# Patient Record
Sex: MALE | Race: ASIAN | NOT HISPANIC OR LATINO
[De-identification: names, ages, dates, MRNs, and addresses within clinical notes are randomized per-mention and may not be internally consistent; named-entity substitution may affect disease eponyms.]

---

## 2021-01-08 ENCOUNTER — APPOINTMENT (OUTPATIENT)
Dept: UROLOGY | Facility: CLINIC | Age: 36
End: 2021-01-08
Payer: COMMERCIAL

## 2021-01-08 VITALS — HEART RATE: 60 BPM | TEMPERATURE: 97.7 F | SYSTOLIC BLOOD PRESSURE: 110 MMHG | DIASTOLIC BLOOD PRESSURE: 71 MMHG

## 2021-01-08 DIAGNOSIS — Z80.3 FAMILY HISTORY OF MALIGNANT NEOPLASM OF BREAST: ICD-10-CM

## 2021-01-08 DIAGNOSIS — Z78.9 OTHER SPECIFIED HEALTH STATUS: ICD-10-CM

## 2021-01-08 PROCEDURE — 99072 ADDL SUPL MATRL&STAF TM PHE: CPT

## 2021-01-08 PROCEDURE — 99204 OFFICE O/P NEW MOD 45 MIN: CPT

## 2021-01-08 NOTE — HISTORY OF PRESENT ILLNESS
[FreeTextEntry1] : 36 yo male referred for left renal stones seen on CT ordered by the patient's PCP for hematuria and left flank pain.  The patient noted gross hematuria and left flank discomfort on 12/23.  CT scan showed 4-5 left sided non-obstructing renal stones ranging in size from 1-4 mm.  There was no hydronephrosis noted.  A pelvic calcification was seen which seems most likely to be a phlebolith.  \par \par He currently is pain free for over a week and has not seen any blood in his urine since the 23rd.  He has no personal or family hx of stones. He denies any baseline voiding symptoms.

## 2021-01-08 NOTE — PHYSICAL EXAM
[General Appearance - Well Developed] : well developed [Normal Appearance] : normal appearance [General Appearance - In No Acute Distress] : no acute distress [Heart Rate And Rhythm] : Heart rate and rhythm were normal [] : no respiratory distress [Abdomen Soft] : soft [Abdomen Tenderness] : non-tender [Costovertebral Angle Tenderness] : no ~M costovertebral angle tenderness [Penis Abnormality] : normal uncircumcised penis [Testes Tenderness] : no tenderness of the testes [Testes Mass (___cm)] : there were no testicular masses [Prostate Tenderness] : the prostate was not tender [No Prostate Nodules] : no prostate nodules [Normal Station and Gait] : the gait and station were normal for the patient's age [Skin Color & Pigmentation] : normal skin color and pigmentation [No Focal Deficits] : no focal deficits [Oriented To Time, Place, And Person] : oriented to person, place, and time

## 2021-01-08 NOTE — ASSESSMENT
[FreeTextEntry1] : 34 yo male with probable recently passed stone and with 4-5 small non-obstructing left renal stones.\par \par The patient was seen and examined and results were reviewed. \par \par Kidney stone disease was reviewed with the patient and etiologies/risk factors were discussed. Preventative measures were discussed including hydration, diet modification, and medications.Options for management were reviewed including conservative management (no intervention), medical expulsive therapy, and surgical management. Merits and limitations of each option was discussed. \par \par The possibility of spontaneous stone passage based on stone size and location was reviewed. Risks of spontaneous passage including pain, hematuria, fever, chills, nausea/emesis, infection, urosepsis, dehydration, ureteral or renal injury, need for repeat office or emergency room visits, and permanent loss of renal function were reviewed.\par \par Surgical options were presented including ureteral stent placement, extracorporeal shockwave lithotripsy (ESWL), and ureteroscopy with laser lithotripsy. The procedures were discussed in depth. Success rates, complications, and potential for subsequent procedures was discussed for each option.The role of imaging studies including ultrasound, plain film x-rays, and CT scan were discussed, as well as potential radiation exposure risks. Metabolic evaluation with serum chemistry and 24 hour urine collections were presented for the purpose of determining the etiology of the patient's stone forming risk factors.\par \par He would like to proceed with scheduling URS with laser lithotripsy.  We reviewed the risks and benefits.  We reviewed the need for a ureteral stent and the associated symptoms that he may experience with a stent. \par

## 2021-01-11 LAB
APPEARANCE: CLEAR
BACTERIA UR CULT: NORMAL
BACTERIA: NEGATIVE
BILIRUBIN URINE: NEGATIVE
BLOOD URINE: NEGATIVE
COLOR: NORMAL
GLUCOSE QUALITATIVE U: NEGATIVE
HYALINE CASTS: 1 /LPF
KETONES URINE: NEGATIVE
LEUKOCYTE ESTERASE URINE: NEGATIVE
MICROSCOPIC-UA: NORMAL
NITRITE URINE: NEGATIVE
PH URINE: 6
PROTEIN URINE: NEGATIVE
RED BLOOD CELLS URINE: 0 /HPF
SPECIFIC GRAVITY URINE: 1.02
SQUAMOUS EPITHELIAL CELLS: 0 /HPF
UROBILINOGEN URINE: NORMAL
WHITE BLOOD CELLS URINE: 0 /HPF

## 2021-02-15 ENCOUNTER — TRANSCRIPTION ENCOUNTER (OUTPATIENT)
Age: 36
End: 2021-02-15

## 2021-02-16 ENCOUNTER — APPOINTMENT (OUTPATIENT)
Dept: UROLOGY | Facility: AMBULATORY SURGERY CENTER | Age: 36
End: 2021-02-16

## 2021-02-16 ENCOUNTER — OUTPATIENT (OUTPATIENT)
Dept: OUTPATIENT SERVICES | Facility: HOSPITAL | Age: 36
LOS: 1 days | Discharge: HOME | End: 2021-02-16
Payer: COMMERCIAL

## 2021-02-16 DIAGNOSIS — N20.0 CALCULUS OF KIDNEY: ICD-10-CM

## 2021-02-16 LAB — SARS-COV-2 N GENE NPH QL NAA+PROBE: NOT DETECTED

## 2021-02-16 PROCEDURE — 52356 CYSTO/URETERO W/LITHOTRIPSY: CPT | Mod: LT

## 2021-02-16 PROCEDURE — 74420 UROGRAPHY RTRGR +-KUB: CPT | Mod: 26,LT

## 2021-02-16 RX ORDER — CEPHALEXIN 500 MG
1 CAPSULE ORAL
Qty: 6 | Refills: 0
Start: 2021-02-16 | End: 2021-02-18

## 2021-02-16 RX ORDER — DIAZEPAM 5 MG
1 TABLET ORAL
Qty: 12 | Refills: 0
Start: 2021-02-16 | End: 2021-02-19

## 2021-02-16 NOTE — BRIEF OPERATIVE NOTE - NSICDXBRIEFPROCEDURE_GEN_ALL_CORE_FT
PROCEDURES:  Cystoscopic insertion of left ureteral stent 16-Feb-2021 18:01:31  Curtis Ohara  Left ureteroscopy with laser lithotripsy 16-Feb-2021 18:01:14  Curtis Ohara

## 2021-02-20 ENCOUNTER — EMERGENCY (EMERGENCY)
Facility: HOSPITAL | Age: 36
LOS: 1 days | Discharge: HOME | End: 2021-02-20
Attending: EMERGENCY MEDICINE | Admitting: EMERGENCY MEDICINE
Payer: COMMERCIAL

## 2021-02-20 ENCOUNTER — NON-APPOINTMENT (OUTPATIENT)
Age: 36
End: 2021-02-20

## 2021-02-20 VITALS
WEIGHT: 149.91 LBS | DIASTOLIC BLOOD PRESSURE: 85 MMHG | HEIGHT: 67 IN | TEMPERATURE: 98 F | OXYGEN SATURATION: 98 % | SYSTOLIC BLOOD PRESSURE: 136 MMHG | RESPIRATION RATE: 18 BRPM | HEART RATE: 75 BPM

## 2021-02-20 VITALS
OXYGEN SATURATION: 99 % | RESPIRATION RATE: 16 BRPM | SYSTOLIC BLOOD PRESSURE: 130 MMHG | TEMPERATURE: 98 F | HEART RATE: 70 BPM | DIASTOLIC BLOOD PRESSURE: 78 MMHG

## 2021-02-20 DIAGNOSIS — N99.89 OTHER POSTPROCEDURAL COMPLICATIONS AND DISORDERS OF GENITOURINARY SYSTEM: ICD-10-CM

## 2021-02-20 DIAGNOSIS — R31.9 HEMATURIA, UNSPECIFIED: ICD-10-CM

## 2021-02-20 LAB
ALBUMIN SERPL ELPH-MCNC: 4.2 G/DL — SIGNIFICANT CHANGE UP (ref 3.3–5)
ALP SERPL-CCNC: 80 U/L — SIGNIFICANT CHANGE UP (ref 40–120)
ALT FLD-CCNC: 12 U/L — SIGNIFICANT CHANGE UP (ref 10–45)
ANION GAP SERPL CALC-SCNC: 10 MMOL/L — SIGNIFICANT CHANGE UP (ref 5–17)
APPEARANCE UR: CLEAR — SIGNIFICANT CHANGE UP
APTT BLD: 35.5 SEC — SIGNIFICANT CHANGE UP (ref 27.5–35.5)
AST SERPL-CCNC: 14 U/L — SIGNIFICANT CHANGE UP (ref 10–40)
BACTERIA # UR AUTO: PRESENT /HPF
BASOPHILS # BLD AUTO: 0.01 K/UL — SIGNIFICANT CHANGE UP (ref 0–0.2)
BASOPHILS NFR BLD AUTO: 0.2 % — SIGNIFICANT CHANGE UP (ref 0–2)
BILIRUB SERPL-MCNC: 0.2 MG/DL — SIGNIFICANT CHANGE UP (ref 0.2–1.2)
BILIRUB UR-MCNC: NEGATIVE — SIGNIFICANT CHANGE UP
BUN SERPL-MCNC: 13 MG/DL — SIGNIFICANT CHANGE UP (ref 7–23)
CALCIUM SERPL-MCNC: 10 MG/DL — SIGNIFICANT CHANGE UP (ref 8.4–10.5)
CHLORIDE SERPL-SCNC: 105 MMOL/L — SIGNIFICANT CHANGE UP (ref 96–108)
CO2 SERPL-SCNC: 26 MMOL/L — SIGNIFICANT CHANGE UP (ref 22–31)
COLOR SPEC: ABNORMAL
CREAT SERPL-MCNC: 0.99 MG/DL — SIGNIFICANT CHANGE UP (ref 0.5–1.3)
CULTURE RESULTS: NO GROWTH — SIGNIFICANT CHANGE UP
DIFF PNL FLD: ABNORMAL
EOSINOPHIL # BLD AUTO: 0.03 K/UL — SIGNIFICANT CHANGE UP (ref 0–0.5)
EOSINOPHIL NFR BLD AUTO: 0.5 % — SIGNIFICANT CHANGE UP (ref 0–6)
EPI CELLS # UR: SIGNIFICANT CHANGE UP /HPF (ref 0–5)
GLUCOSE SERPL-MCNC: 90 MG/DL — SIGNIFICANT CHANGE UP (ref 70–99)
GLUCOSE UR QL: NEGATIVE — SIGNIFICANT CHANGE UP
HCT VFR BLD CALC: 38 % — LOW (ref 39–50)
HGB BLD-MCNC: 12.6 G/DL — LOW (ref 13–17)
IMM GRANULOCYTES NFR BLD AUTO: 0.2 % — SIGNIFICANT CHANGE UP (ref 0–1.5)
INR BLD: 1.07 — SIGNIFICANT CHANGE UP (ref 0.88–1.16)
KETONES UR-MCNC: NEGATIVE — SIGNIFICANT CHANGE UP
LEUKOCYTE ESTERASE UR-ACNC: ABNORMAL
LYMPHOCYTES # BLD AUTO: 1.34 K/UL — SIGNIFICANT CHANGE UP (ref 1–3.3)
LYMPHOCYTES # BLD AUTO: 21.1 % — SIGNIFICANT CHANGE UP (ref 13–44)
MCHC RBC-ENTMCNC: 31.3 PG — SIGNIFICANT CHANGE UP (ref 27–34)
MCHC RBC-ENTMCNC: 33.2 GM/DL — SIGNIFICANT CHANGE UP (ref 32–36)
MCV RBC AUTO: 94.3 FL — SIGNIFICANT CHANGE UP (ref 80–100)
MONOCYTES # BLD AUTO: 0.43 K/UL — SIGNIFICANT CHANGE UP (ref 0–0.9)
MONOCYTES NFR BLD AUTO: 6.8 % — SIGNIFICANT CHANGE UP (ref 2–14)
NEUTROPHILS # BLD AUTO: 4.53 K/UL — SIGNIFICANT CHANGE UP (ref 1.8–7.4)
NEUTROPHILS NFR BLD AUTO: 71.2 % — SIGNIFICANT CHANGE UP (ref 43–77)
NITRITE UR-MCNC: POSITIVE
NRBC # BLD: 0 /100 WBCS — SIGNIFICANT CHANGE UP (ref 0–0)
PH UR: 7 — SIGNIFICANT CHANGE UP (ref 5–8)
PLATELET # BLD AUTO: 229 K/UL — SIGNIFICANT CHANGE UP (ref 150–400)
POTASSIUM SERPL-MCNC: 3.7 MMOL/L — SIGNIFICANT CHANGE UP (ref 3.5–5.3)
POTASSIUM SERPL-SCNC: 3.7 MMOL/L — SIGNIFICANT CHANGE UP (ref 3.5–5.3)
PROT SERPL-MCNC: 7.9 G/DL — SIGNIFICANT CHANGE UP (ref 6–8.3)
PROT UR-MCNC: >=300 MG/DL
PROTHROM AB SERPL-ACNC: 12.8 SEC — SIGNIFICANT CHANGE UP (ref 10.6–13.6)
RBC # BLD: 4.03 M/UL — LOW (ref 4.2–5.8)
RBC # FLD: 12.2 % — SIGNIFICANT CHANGE UP (ref 10.3–14.5)
RBC CASTS # UR COMP ASSIST: ABNORMAL /HPF
SODIUM SERPL-SCNC: 141 MMOL/L — SIGNIFICANT CHANGE UP (ref 135–145)
SP GR SPEC: 1.02 — SIGNIFICANT CHANGE UP (ref 1–1.03)
SPECIMEN SOURCE: SIGNIFICANT CHANGE UP
UROBILINOGEN FLD QL: 0.2 E.U./DL — SIGNIFICANT CHANGE UP
WBC # BLD: 6.35 K/UL — SIGNIFICANT CHANGE UP (ref 3.8–10.5)
WBC # FLD AUTO: 6.35 K/UL — SIGNIFICANT CHANGE UP (ref 3.8–10.5)
WBC UR QL: > 10 /HPF

## 2021-02-20 PROCEDURE — 99285 EMERGENCY DEPT VISIT HI MDM: CPT

## 2021-02-20 PROCEDURE — 99284 EMERGENCY DEPT VISIT MOD MDM: CPT | Mod: 25

## 2021-02-20 PROCEDURE — 87086 URINE CULTURE/COLONY COUNT: CPT

## 2021-02-20 PROCEDURE — 81001 URINALYSIS AUTO W/SCOPE: CPT

## 2021-02-20 PROCEDURE — 85730 THROMBOPLASTIN TIME PARTIAL: CPT

## 2021-02-20 PROCEDURE — 85610 PROTHROMBIN TIME: CPT

## 2021-02-20 PROCEDURE — 85025 COMPLETE CBC W/AUTO DIFF WBC: CPT

## 2021-02-20 PROCEDURE — 76770 US EXAM ABDO BACK WALL COMP: CPT | Mod: 26

## 2021-02-20 PROCEDURE — 80053 COMPREHEN METABOLIC PANEL: CPT

## 2021-02-20 PROCEDURE — 96361 HYDRATE IV INFUSION ADD-ON: CPT

## 2021-02-20 PROCEDURE — 36415 COLL VENOUS BLD VENIPUNCTURE: CPT

## 2021-02-20 PROCEDURE — 76770 US EXAM ABDO BACK WALL COMP: CPT

## 2021-02-20 PROCEDURE — 96360 HYDRATION IV INFUSION INIT: CPT

## 2021-02-20 RX ORDER — CIPROFLOXACIN LACTATE 400MG/40ML
500 VIAL (ML) INTRAVENOUS ONCE
Refills: 0 | Status: COMPLETED | OUTPATIENT
Start: 2021-02-20 | End: 2021-02-20

## 2021-02-20 RX ORDER — SODIUM CHLORIDE 9 MG/ML
1000 INJECTION INTRAMUSCULAR; INTRAVENOUS; SUBCUTANEOUS ONCE
Refills: 0 | Status: COMPLETED | OUTPATIENT
Start: 2021-02-20 | End: 2021-02-20

## 2021-02-20 RX ORDER — TAMSULOSIN HYDROCHLORIDE 0.4 MG/1
1 CAPSULE ORAL
Qty: 7 | Refills: 0
Start: 2021-02-20 | End: 2021-02-26

## 2021-02-20 RX ORDER — MOXIFLOXACIN HYDROCHLORIDE TABLETS, 400 MG 400 MG/1
1 TABLET, FILM COATED ORAL
Qty: 10 | Refills: 0
Start: 2021-02-20 | End: 2021-02-24

## 2021-02-20 RX ADMIN — SODIUM CHLORIDE 1000 MILLILITER(S): 9 INJECTION INTRAMUSCULAR; INTRAVENOUS; SUBCUTANEOUS at 12:58

## 2021-02-20 RX ADMIN — Medication 500 MILLIGRAM(S): at 18:26

## 2021-02-20 RX ADMIN — SODIUM CHLORIDE 1000 MILLILITER(S): 9 INJECTION INTRAMUSCULAR; INTRAVENOUS; SUBCUTANEOUS at 18:19

## 2021-02-20 NOTE — ED ADULT TRIAGE NOTE - CHIEF COMPLAINT QUOTE
On feb 16th, had L ureteroscopy with laser lithotripsy and ureteral stent insertion. today pt paced quater sized clots when urinating x 2 hours ago. has not been able to urinate since. + urgency. On feb 16th, had L ureteroscopy with laser lithotripsy and ureteral stent insertion. today pt passed quater sized clots when urinating x 2 hours ago. has not been able to urinate since. + urgency.

## 2021-02-20 NOTE — ED PROVIDER NOTE - ATTENDING CONTRIBUTION TO CARE
pt had uretal stent placed 4 d ago, c/o hematuria today -had some hesitancy and urgency earlier, labs wnl, seen by gu and wanting us, pt urinating w/o dif in ed, plan is dc home w/rx for cipro and f/u in 2 d, pt understands and agrees w/plan, strict return precautions given    Agree with above note as documented by PA.  I was available as the supervising attending during patient's ER evaluation.

## 2021-02-20 NOTE — ED ADULT NURSE NOTE - NSIMPLEMENTINTERV_GEN_ALL_ED
Implemented All Universal Safety Interventions:  [place] to call system. Call bell, personal items and telephone within reach. Instruct patient to call for assistance. Room bathroom lighting operational. Non-slip footwear when patient is off stretcher. Physically safe environment: no spills, clutter or unnecessary equipment. Stretcher in lowest position, wheels locked, appropriate side rails in place.

## 2021-02-20 NOTE — ED PROVIDER NOTE - PATIENT PORTAL LINK FT
You can access the FollowMyHealth Patient Portal offered by [facility] by registering at the following website: [email or web address]. By joining [employer]’s FollowMyHealth portal, you will also be able to view your health information using other applications (apps) compatible with our system.

## 2021-02-20 NOTE — ED PROVIDER NOTE - CARE PROVIDER_API CALL
Roby Turner)  Urology  [address] [place] [address], [place] [address]  Phone: [phone]  Fax: [phone]  Follow Up Time:

## 2021-02-20 NOTE — ED PROVIDER NOTE - NSFOLLOWUPINSTRUCTIONS_ED_ALL_ED_FT
Hematuria  WHAT YOU NEED TO KNOW:  Hematuria is blood in your urine. Your urine may be bright red to dark brown.  DISCHARGE INSTRUCTIONS:  Return to the emergency department if:   •You have blood in your urine after a new injury, such as a fall.  •You have severe back or side pain that does not go away with treatment.  Call your doctor if:   •You are urinating very small amounts or not at all.  •You feel like you cannot empty your bladder.  •You have a fever that gets worse or does not go away with treatment.  •You cannot keep liquids or medicines down.  •Your urine gets darker, even after you drink extra liquids.  •You have questions or concerns about your condition, treatment, or care.  need to drink extra liquids to help flush the blood from your body through your urine. Water is the best liquid to drink. Ask how much liquid to drink each day and which liquids are best for you.

## 2021-02-20 NOTE — ED ADULT NURSE NOTE - CHIEF COMPLAINT QUOTE
On feb 16th, had L ureteroscopy with laser lithotripsy and ureteral stent insertion. today pt paced quater sized clots when urinating x 2 hours ago. has not been able to urinate since. + urgency.

## 2021-02-20 NOTE — CONSULT NOTE ADULT - SUBJECTIVE AND OBJECTIVE BOX
Patient is a 35y old  Male who presents with a chief complaint of clot retention.  He     Vital Signs Last 24 Hrs  T(C): 36.8 (20 Feb 2021 12:28), Max: 36.8 (20 Feb 2021 12:28)  T(F): 98.2 (20 Feb 2021 12:28), Max: 98.2 (20 Feb 2021 12:28)  HR: 75 (20 Feb 2021 12:28) (75 - 75)  BP: 136/85 (20 Feb 2021 12:28) (136/85 - 136/85)  BP(mean): --  RR: 18 (20 Feb 2021 12:28) (18 - 18)  SpO2: 98% (20 Feb 2021 12:28) (98% - 98%)  I&O's Summary      PE:  Gen:  Abd:  :  FREDERIC:    LABS:                        12.6   6.35  )-----------( 229      ( 20 Feb 2021 13:02 )             38.0     02-20    141  |  105  |  13  ----------------------------<  90  3.7   |  26  |  0.99    Ca    10.0      20 Feb 2021 13:02    TPro  7.9  /  Alb  4.2  /  TBili  0.2  /  DBili  x   /  AST  14  /  ALT  12  /  AlkPhos  80  02-20    PT/INR - ( 20 Feb 2021 13:02 )   PT: 12.8 sec;   INR: 1.07          PTT - ( 20 Feb 2021 13:02 )  PTT:35.5 sec  Cultures  Culture Results:   No growth (02-17 @ 11:09)      A/P 35M     In a sterile manner, a 6 eye was placed and irrigated with return of clots, titrated to clear urine   -He passed TOV, urine was clear  -For + UA, ciprofloxacin x 7 days  -He will follow up outpatient with Dr. Turner   -He will call with any questions or concerns    Case was seen with  residents Patient is a 35y old  Male who presents with a chief complaint of clot retention.      He had an US renal/bladder that showed elevated PVR, left nonobstructive stones and clots.     Vital Signs Last 24 Hrs  T(C): 36.8 (20 Feb 2021 12:28), Max: 36.8 (20 Feb 2021 12:28)  T(F): 98.2 (20 Feb 2021 12:28), Max: 98.2 (20 Feb 2021 12:28)  HR: 75 (20 Feb 2021 12:28) (75 - 75)  BP: 136/85 (20 Feb 2021 12:28) (136/85 - 136/85)  BP(mean): --  RR: 18 (20 Feb 2021 12:28) (18 - 18)  SpO2: 98% (20 Feb 2021 12:28) (98% - 98%)  I&O's Summary    PE:  Gen: NAD  Abd: soft, nt/nd  : urinating clots      LABS:                        12.6   6.35  )-----------( 229      ( 20 Feb 2021 13:02 )             38.0     02-20    141  |  105  |  13  ----------------------------<  90  3.7   |  26  |  0.99    Ca    10.0      20 Feb 2021 13:02    TPro  7.9  /  Alb  4.2  /  TBili  0.2  /  DBili  x   /  AST  14  /  ALT  12  /  AlkPhos  80  02-20    PT/INR - ( 20 Feb 2021 13:02 )   PT: 12.8 sec;   INR: 1.07          PTT - ( 20 Feb 2021 13:02 )  PTT:35.5 sec  Cultures  Culture Results:   No growth (02-17 @ 11:09)    A/P 35M with recent history of left laser lithotripsy, left stent placement on 2/16/2021. He presented with the ED with hematuria and urgency.     -His US was reviewed. In a sterile manner, a 6 eye was placed and irrigated with return of clots, titrated to clear urine   -He passed TOV, urine was clear  -For + UA, ciprofloxacin x 7 days  -Flomax for stent discomfort   -He will follow up outpatient with Dr. Turner   -He will call with any questions or concerns    Case was seen with  residents Patient is a 35y old  Male who presents with a chief complaint of clot retention. He underwent left laser lithotripsy and left stent placement on 2/16. He was able to urinate well until today. He reports passing few clots and reporting urgency/frequency. He has never had urinary issues before this. He reports he had abdominal pain, this has improved. He is hydrating well.    He denies fever, chills, dysuria, n/v, SOB or CP.    He had an US renal/bladder that showed elevated PVR, left nonobstructive stones and clots.     Vital Signs Last 24 Hrs  T(C): 36.8 (20 Feb 2021 12:28), Max: 36.8 (20 Feb 2021 12:28)  T(F): 98.2 (20 Feb 2021 12:28), Max: 98.2 (20 Feb 2021 12:28)  HR: 75 (20 Feb 2021 12:28) (75 - 75)  BP: 136/85 (20 Feb 2021 12:28) (136/85 - 136/85)  BP(mean): --  RR: 18 (20 Feb 2021 12:28) (18 - 18)  SpO2: 98% (20 Feb 2021 12:28) (98% - 98%)  I&O's Summary    PE:  Gen: NAD  Abd: soft, nt/nd  : normal, phallus, urinating clots      LABS:                        12.6   6.35  )-----------( 229      ( 20 Feb 2021 13:02 )             38.0     02-20    141  |  105  |  13  ----------------------------<  90  3.7   |  26  |  0.99    Ca    10.0      20 Feb 2021 13:02    TPro  7.9  /  Alb  4.2  /  TBili  0.2  /  DBili  x   /  AST  14  /  ALT  12  /  AlkPhos  80  02-20    PT/INR - ( 20 Feb 2021 13:02 )   PT: 12.8 sec;   INR: 1.07          PTT - ( 20 Feb 2021 13:02 )  PTT:35.5 sec  Cultures  Culture Results:   No growth (02-17 @ 11:09)    A/P 35M with recent history of left laser lithotripsy, left stent placement on 2/16/2021. He presented with the ED with hematuria and urgency.     -His US was reviewed. In a sterile manner, a 6 eye was placed and irrigated with return of clots, titrated to clear urine   -He passed TOV. PVR minimal. His urine was light pink. He was advised to hydrate well   -For + UA, ciprofloxacin x 7 days  -Flomax for stent discomfort   -He will follow up outpatient with Dr. Turner on Monday   -He will call with any questions or concerns    Case was seen with  residents

## 2021-02-20 NOTE — ED PROVIDER NOTE - CLINICAL SUMMARY MEDICAL DECISION MAKING FREE TEXT BOX
pt had uretal stent placed 4 d ago, c/o hematuria today -had some hesitancy and urgency earlier, labs wnl, seen by gu and wanting us, pt urinating w/o dif in ed, plan is dc home w/rx for cipro and f/u in 2 d, pt understands and agrees w/plan, strict return precautions given

## 2021-02-20 NOTE — ED PROVIDER NOTE - OBJECTIVE STATEMENT
The pt is a 34 y/o M, who presents to ED c/o hematuria - had a uretal stent placed on 2/16 for kidney stones. Pt states that had urgency and hesitancy this am, then urinated bloody urine w/clots, had some lower abd pressure earlier but now fully resolved. Denies fevers, chills, flank pain, n/v/d, dysuria, dizziness, syncope

## 2021-02-21 ENCOUNTER — INPATIENT (INPATIENT)
Facility: HOSPITAL | Age: 36
LOS: 0 days | Discharge: HOME | DRG: 700 | End: 2021-02-22
Attending: UROLOGY | Admitting: UROLOGY
Payer: COMMERCIAL

## 2021-02-21 VITALS
SYSTOLIC BLOOD PRESSURE: 130 MMHG | WEIGHT: 149.91 LBS | DIASTOLIC BLOOD PRESSURE: 85 MMHG | HEIGHT: 67 IN | OXYGEN SATURATION: 98 % | TEMPERATURE: 99 F | RESPIRATION RATE: 17 BRPM | HEART RATE: 103 BPM
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
## 2021-02-21 DIAGNOSIS — R33.8 OTHER RETENTION OF URINE: ICD-10-CM

## 2021-02-21 DIAGNOSIS — N20.0 CALCULUS OF KIDNEY: ICD-10-CM

## 2021-02-21 DIAGNOSIS — N99.89 OTHER POSTPROCEDURAL COMPLICATIONS AND DISORDERS OF GENITOURINARY SYSTEM: ICD-10-CM
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
## 2021-02-21 DIAGNOSIS — R31.0 GROSS HEMATURIA: ICD-10-CM

## 2021-02-21 DIAGNOSIS — R33.9 RETENTION OF URINE, UNSPECIFIED: ICD-10-CM

## 2021-02-21 LAB
BASOPHILS # BLD AUTO: 0.02 K/UL — SIGNIFICANT CHANGE UP (ref 0–0.2)
BASOPHILS NFR BLD AUTO: 0.2 % — SIGNIFICANT CHANGE UP (ref 0–2)
CALCIUM SERPL-MCNC: 9.7 MG/DL — SIGNIFICANT CHANGE UP (ref 8.4–10.5)
CO2 SERPL-SCNC: 25 MMOL/L — SIGNIFICANT CHANGE UP (ref 22–31)
CREAT SERPL-MCNC: 1.03 MG/DL — SIGNIFICANT CHANGE UP (ref 0.5–1.3)
CULTURE RESULTS: NO GROWTH — SIGNIFICANT CHANGE UP
EOSINOPHIL # BLD AUTO: 0.02 K/UL — SIGNIFICANT CHANGE UP (ref 0–0.5)
EOSINOPHIL NFR BLD AUTO: 0.2 % — SIGNIFICANT CHANGE UP (ref 0–6)
HCT VFR BLD CALC: 30.4 % — LOW (ref 39–50)
HGB BLD-MCNC: 10.2 G/DL — LOW (ref 13–17)
IMM GRANULOCYTES NFR BLD AUTO: 0.4 % — SIGNIFICANT CHANGE UP (ref 0–1.5)
LYMPHOCYTES # BLD AUTO: 1.3 K/UL — SIGNIFICANT CHANGE UP (ref 1–3.3)
LYMPHOCYTES # BLD AUTO: 13.6 % — SIGNIFICANT CHANGE UP (ref 13–44)
MCHC RBC-ENTMCNC: 31.4 PG — SIGNIFICANT CHANGE UP (ref 27–34)
MCHC RBC-ENTMCNC: 33.6 GM/DL — SIGNIFICANT CHANGE UP (ref 32–36)
MCV RBC AUTO: 93.5 FL — SIGNIFICANT CHANGE UP (ref 80–100)
MONOCYTES # BLD AUTO: 0.48 K/UL — SIGNIFICANT CHANGE UP (ref 0–0.9)
MONOCYTES NFR BLD AUTO: 5 % — SIGNIFICANT CHANGE UP (ref 2–14)
NEUTROPHILS # BLD AUTO: 7.7 K/UL — HIGH (ref 1.8–7.4)
NEUTROPHILS NFR BLD AUTO: 80.6 % — HIGH (ref 43–77)
NRBC # BLD: 0 /100 WBCS — SIGNIFICANT CHANGE UP (ref 0–0)
PLATELET # BLD AUTO: 215 K/UL — SIGNIFICANT CHANGE UP (ref 150–400)
RBC # BLD: 3.25 M/UL — LOW (ref 4.2–5.8)
RBC # FLD: 12.2 % — SIGNIFICANT CHANGE UP (ref 10.3–14.5)
SPECIMEN SOURCE: SIGNIFICANT CHANGE UP
WBC # BLD: 9.56 K/UL — SIGNIFICANT CHANGE UP (ref 3.8–10.5)
WBC # FLD AUTO: 9.56 K/UL — SIGNIFICANT CHANGE UP (ref 3.8–10.5)

## 2021-02-21 PROCEDURE — 99285 EMERGENCY DEPT VISIT HI MDM: CPT

## 2021-02-21 NOTE — ED ADULT NURSE NOTE - OBJECTIVE STATEMENT
Pt presents Pt presents to ED C/O urinary retention and hematuria. Seen yesterday at [facility] by urology. Urology paged. Pt states "I urinated about five minutes ago a very small amount and then about two hours ago, it was bloody" Pt made comfortable, awaiting URO consult..

## 2021-02-21 NOTE — ED PROVIDER NOTE - CLINICAL SUMMARY MEDICAL DECISION MAKING FREE TEXT BOX
pt returns c/o hematuria, urgency and hesitancy - seen for same yest and dc'd home by gu (had labs/us), is s/p stent placement on 2/14, pt states gross hematuria and passing clots, seen by gu again and will admit for CBI

## 2021-02-21 NOTE — ED PROVIDER NOTE - OBJECTIVE STATEMENT
The pt is a 36 y/o M, who returns to ED c/o hematuria, urgency and hesitancy - had a uretal stent placed on 2/16, seen yest for hematuria, urgency, hesitancy - had labs, us and cleared by gu for dc (w/abx and pyridium - taking as Rx'd). Pt states that today has been urinating small amounts and urine is grossly bloody again, passing large clots again and feels like not emptying bladder. Denies cp, sob, n/v/d, flank pain, fevers, chills, dizziness, syncope

## 2021-02-22 ENCOUNTER — TRANSCRIPTION ENCOUNTER (OUTPATIENT)
Age: 36
End: 2021-02-22

## 2021-02-22 VITALS
SYSTOLIC BLOOD PRESSURE: 115 MMHG | RESPIRATION RATE: 17 BRPM | TEMPERATURE: 98 F | DIASTOLIC BLOOD PRESSURE: 72 MMHG | HEART RATE: 70 BPM | OXYGEN SATURATION: 98 %

## 2021-02-22 DIAGNOSIS — Z98.890 OTHER SPECIFIED POSTPROCEDURAL STATES: Chronic | ICD-10-CM

## 2021-02-22 DIAGNOSIS — R33.8 OTHER RETENTION OF URINE: ICD-10-CM

## 2021-02-22 LAB
ALBUMIN SERPL ELPH-MCNC: 4 G/DL — SIGNIFICANT CHANGE UP (ref 3.3–5)
ALP SERPL-CCNC: 84 U/L — SIGNIFICANT CHANGE UP (ref 40–120)
ALT FLD-CCNC: 15 U/L — SIGNIFICANT CHANGE UP (ref 10–45)
ANION GAP SERPL CALC-SCNC: 11 MMOL/L — SIGNIFICANT CHANGE UP (ref 5–17)
ANION GAP SERPL CALC-SCNC: 8 MMOL/L — SIGNIFICANT CHANGE UP (ref 5–17)
APPEARANCE UR: ABNORMAL
AST SERPL-CCNC: 19 U/L — SIGNIFICANT CHANGE UP (ref 10–40)
BACTERIA # UR AUTO: PRESENT /HPF
BILIRUB SERPL-MCNC: 0.2 MG/DL — SIGNIFICANT CHANGE UP (ref 0.2–1.2)
BILIRUB UR-MCNC: NEGATIVE — SIGNIFICANT CHANGE UP
BUN SERPL-MCNC: 14 MG/DL — SIGNIFICANT CHANGE UP (ref 7–23)
BUN SERPL-MCNC: 18 MG/DL — SIGNIFICANT CHANGE UP (ref 7–23)
CALCIUM SERPL-MCNC: 8.8 MG/DL — SIGNIFICANT CHANGE UP (ref 8.4–10.5)
CHLORIDE SERPL-SCNC: 107 MMOL/L — SIGNIFICANT CHANGE UP (ref 96–108)
CHLORIDE SERPL-SCNC: 98 MMOL/L — SIGNIFICANT CHANGE UP (ref 96–108)
CO2 SERPL-SCNC: 25 MMOL/L — SIGNIFICANT CHANGE UP (ref 22–31)
COLOR SPEC: ABNORMAL
CREAT SERPL-MCNC: 0.92 MG/DL — SIGNIFICANT CHANGE UP (ref 0.5–1.3)
DIFF PNL FLD: ABNORMAL
EPI CELLS # UR: SIGNIFICANT CHANGE UP /HPF (ref 0–5)
GLUCOSE SERPL-MCNC: 108 MG/DL — HIGH (ref 70–99)
GLUCOSE SERPL-MCNC: 131 MG/DL — HIGH (ref 70–99)
GLUCOSE UR QL: NEGATIVE — SIGNIFICANT CHANGE UP
HCT VFR BLD CALC: 26.1 % — LOW (ref 39–50)
HGB BLD-MCNC: 8.8 G/DL — LOW (ref 13–17)
KETONES UR-MCNC: 15 MG/DL
LEUKOCYTE ESTERASE UR-ACNC: ABNORMAL
MAGNESIUM SERPL-MCNC: 1.8 MG/DL — SIGNIFICANT CHANGE UP (ref 1.6–2.6)
MCHC RBC-ENTMCNC: 31.4 PG — SIGNIFICANT CHANGE UP (ref 27–34)
MCHC RBC-ENTMCNC: 33.7 GM/DL — SIGNIFICANT CHANGE UP (ref 32–36)
MCV RBC AUTO: 93.2 FL — SIGNIFICANT CHANGE UP (ref 80–100)
NITRITE UR-MCNC: POSITIVE
NRBC # BLD: 0 /100 WBCS — SIGNIFICANT CHANGE UP (ref 0–0)
PH UR: 7 — SIGNIFICANT CHANGE UP (ref 5–8)
PHOSPHATE SERPL-MCNC: 3.7 MG/DL — SIGNIFICANT CHANGE UP (ref 2.5–4.5)
PLATELET # BLD AUTO: 191 K/UL — SIGNIFICANT CHANGE UP (ref 150–400)
POTASSIUM SERPL-MCNC: 3.7 MMOL/L — SIGNIFICANT CHANGE UP (ref 3.5–5.3)
POTASSIUM SERPL-MCNC: 4.5 MMOL/L — SIGNIFICANT CHANGE UP (ref 3.5–5.3)
POTASSIUM SERPL-SCNC: 3.7 MMOL/L — SIGNIFICANT CHANGE UP (ref 3.5–5.3)
POTASSIUM SERPL-SCNC: 4.5 MMOL/L — SIGNIFICANT CHANGE UP (ref 3.5–5.3)
PROT SERPL-MCNC: 7 G/DL — SIGNIFICANT CHANGE UP (ref 6–8.3)
PROT UR-MCNC: >=300 MG/DL
RBC # BLD: 2.8 M/UL — LOW (ref 4.2–5.8)
RBC # FLD: 12.3 % — SIGNIFICANT CHANGE UP (ref 10.3–14.5)
RBC CASTS # UR COMP ASSIST: ABNORMAL /HPF
SARS-COV-2 RNA SPEC QL NAA+PROBE: SIGNIFICANT CHANGE UP
SODIUM SERPL-SCNC: 134 MMOL/L — LOW (ref 135–145)
SODIUM SERPL-SCNC: 140 MMOL/L — SIGNIFICANT CHANGE UP (ref 135–145)
SP GR SPEC: 1.02 — SIGNIFICANT CHANGE UP (ref 1–1.03)
UROBILINOGEN FLD QL: 2 E.U./DL
WBC # BLD: 5.72 K/UL — SIGNIFICANT CHANGE UP (ref 3.8–10.5)
WBC # FLD AUTO: 5.72 K/UL — SIGNIFICANT CHANGE UP (ref 3.8–10.5)
WBC UR QL: ABNORMAL /HPF

## 2021-02-22 PROCEDURE — 80053 COMPREHEN METABOLIC PANEL: CPT

## 2021-02-22 PROCEDURE — 36415 COLL VENOUS BLD VENIPUNCTURE: CPT

## 2021-02-22 PROCEDURE — U0005: CPT

## 2021-02-22 PROCEDURE — 84100 ASSAY OF PHOSPHORUS: CPT

## 2021-02-22 PROCEDURE — 99285 EMERGENCY DEPT VISIT HI MDM: CPT

## 2021-02-22 PROCEDURE — 83735 ASSAY OF MAGNESIUM: CPT

## 2021-02-22 PROCEDURE — 85025 COMPLETE CBC W/AUTO DIFF WBC: CPT

## 2021-02-22 PROCEDURE — 81001 URINALYSIS AUTO W/SCOPE: CPT

## 2021-02-22 PROCEDURE — U0003: CPT

## 2021-02-22 PROCEDURE — 87086 URINE CULTURE/COLONY COUNT: CPT

## 2021-02-22 PROCEDURE — 80048 BASIC METABOLIC PNL TOTAL CA: CPT

## 2021-02-22 PROCEDURE — 85027 COMPLETE CBC AUTOMATED: CPT

## 2021-02-22 RX ORDER — DIAZEPAM 5 MG
5 TABLET ORAL ONCE
Refills: 0 | Status: DISCONTINUED | OUTPATIENT
Start: 2021-02-22 | End: 2021-02-22

## 2021-02-22 RX ORDER — ACETAMINOPHEN 500 MG
650 TABLET ORAL EVERY 6 HOURS
Refills: 0 | Status: DISCONTINUED | OUTPATIENT
Start: 2021-02-22 | End: 2021-02-22

## 2021-02-22 RX ORDER — SODIUM CHLORIDE 9 MG/ML
1000 INJECTION INTRAMUSCULAR; INTRAVENOUS; SUBCUTANEOUS ONCE
Refills: 0 | Status: COMPLETED | OUTPATIENT
Start: 2021-02-22 | End: 2021-02-22

## 2021-02-22 RX ORDER — SODIUM CHLORIDE 9 MG/ML
3 INJECTION INTRAMUSCULAR; INTRAVENOUS; SUBCUTANEOUS EVERY 8 HOURS
Refills: 0 | Status: DISCONTINUED | OUTPATIENT
Start: 2021-02-22 | End: 2021-02-22

## 2021-02-22 RX ORDER — OXYBUTYNIN CHLORIDE 5 MG
5 TABLET ORAL EVERY 8 HOURS
Refills: 0 | Status: DISCONTINUED | OUTPATIENT
Start: 2021-02-22 | End: 2021-02-22

## 2021-02-22 RX ORDER — SODIUM CHLORIDE 9 MG/ML
1000 INJECTION INTRAMUSCULAR; INTRAVENOUS; SUBCUTANEOUS
Refills: 0 | Status: DISCONTINUED | OUTPATIENT
Start: 2021-02-22 | End: 2021-02-22

## 2021-02-22 RX ORDER — OXYBUTYNIN CHLORIDE 5 MG
5 TABLET ORAL ONCE
Refills: 0 | Status: COMPLETED | OUTPATIENT
Start: 2021-02-22 | End: 2021-02-22

## 2021-02-22 RX ORDER — MAGNESIUM OXIDE 400 MG ORAL TABLET 241.3 MG
400 TABLET ORAL ONCE
Refills: 0 | Status: COMPLETED | OUTPATIENT
Start: 2021-02-22 | End: 2021-02-22

## 2021-02-22 RX ADMIN — SODIUM CHLORIDE 1000 MILLILITER(S): 9 INJECTION INTRAMUSCULAR; INTRAVENOUS; SUBCUTANEOUS at 01:03

## 2021-02-22 RX ADMIN — Medication 5 MILLIGRAM(S): at 01:18

## 2021-02-22 RX ADMIN — Medication 650 MILLIGRAM(S): at 01:02

## 2021-02-22 RX ADMIN — MAGNESIUM OXIDE 400 MG ORAL TABLET 400 MILLIGRAM(S): 241.3 TABLET ORAL at 07:31

## 2021-02-22 RX ADMIN — SODIUM CHLORIDE 3 MILLILITER(S): 9 INJECTION INTRAMUSCULAR; INTRAVENOUS; SUBCUTANEOUS at 14:22

## 2021-02-22 NOTE — H&P ADULT - HISTORY OF PRESENT ILLNESS
Patient is a 35y old  Male who presents with a chief complaint of clot retention. He underwent left laser lithotripsy and left stent placement on 2/16. He was able to urinate well until 2/20. He presented to [facility]. He underwent 6eye bladder irrigation. Urine was clear-light pink and he was able to void. Patient was discharged. He states today he was urinating okay until this afternoon when he passed some large clots. He states after the episode of passing clots he had trouble urinating. He states he is unable to start a stream and it takes a long period time to urine. Prior to the past 2 days he has never had urinary issues before this. He reports he had abdominal pain, this has improved. He is hydrating well.  He denies fever, chills, dysuria, n/v, SOB or CP.    He had an US renal/bladder 02/20 that showed elevated PVR, left nonobstructive stones and clots.

## 2021-02-22 NOTE — DISCHARGE NOTE PROVIDER - NSDCMRMEDTOKEN_GEN_ALL_CORE_FT
Cipro 500 mg oral tablet: 1 tab(s) orally 2 times a day   Flomax 0.4 mg oral capsule: 1 cap(s) orally once a day  Keflex 500 mg oral capsule: 1 cap(s) orally every 12 hours MDD:2 capsules  Valium 5 mg oral tablet: 1 tab(s) orally every 8 hours, As Needed for bladder spasms MDD:3 tabs

## 2021-02-22 NOTE — H&P ADULT - ASSESSMENT
36 yo male s/p cytoscopy, ureteroscopy, left stent placement with second presentation to ED for clot retention.

## 2021-02-22 NOTE — H&P ADULT - PROBLEM SELECTOR PROBLEM 1
Rechecked her blood sugar two times at 0615 and 0645 after insulin ,still it is exceeds 600,Notified Beatris Fam advised another 20 more insulin.     Clot retention of urine

## 2021-02-22 NOTE — DISCHARGE NOTE PROVIDER - NSDCCPCAREPLAN_GEN_ALL_CORE_FT
PRINCIPAL DISCHARGE DIAGNOSIS  Diagnosis: Clot retention of urine  Assessment and Plan of Treatment:       SECONDARY DISCHARGE DIAGNOSES  Diagnosis: Hematuria  Assessment and Plan of Treatment:

## 2021-02-22 NOTE — H&P ADULT - PROBLEM SELECTOR PLAN 1
-s/p 6 eye irrigation and 22fr 3 way- capped placed   - monitor urine output and color    - bladder spasm control   - f/u labs   - OOB   - diet: regular  - emphasized importance of oral hydration   - discussed with  team

## 2021-02-22 NOTE — DISCHARGE NOTE PROVIDER - HOSPITAL COURSE
36 yo male s/p cytoscopy, ureteroscopy, left stent placement 2/16 with second presentation to ED for clot retention. Manual Irrigation performed, clots removed and urine irrigated to clear. Pt is hemodynamically stable and optimized for discharge.

## 2021-02-22 NOTE — H&P ADULT - NSHPPHYSICALEXAM_GEN_ALL_CORE
general: alert and oriented. no acute distress   abd: soft, non-tender, non-distended   : able to void 100cc of dark red blood, . 6-eye irrigated and a 22fr 3 way was placed. dark red.

## 2021-02-22 NOTE — DISCHARGE NOTE PROVIDER - CARE PROVIDER_API CALL
Roby Turner)  Urology  [address] [place] [address], [place] [address]  Phone: [phone]  Fax: [phone]  Follow Up Time:

## 2021-02-22 NOTE — H&P ADULT - NSHPLABSRESULTS_GEN_ALL_CORE
10.2   9.56  )-----------( 215      ( 21 Feb 2021 23:30 )             30.4   02-21    134<L>  |  98  |  18  ----------------------------<  131<H>  4.5   |  25  |  1.03    Ca    9.7      21 Feb 2021 23:30    TPro  7.0  /  Alb  4.0  /  TBili  0.2  /  DBili  x   /  AST  19  /  ALT  15  /  AlkPhos  84  02-21

## 2021-02-22 NOTE — DISCHARGE NOTE NURSING/CASE MANAGEMENT/SOCIAL WORK - PATIENT PORTAL LINK FT
You can access the FollowMyHealth Patient Portal offered by [facility] by registering at the following website: [email or web address]. By joining [employer]’s FollowMyHealth portal, you will also be able to view your health information using other applications (apps) compatible with our system.

## 2021-02-22 NOTE — DISCHARGE NOTE PROVIDER - NSDCFUADDINST_GEN_ALL_CORE_FT
Stay well hydrated. If fever >100.4 or any change or worsening of symptoms please call doctor or report to ED. Make follow up appointment with Dr Turner.

## 2021-02-23 LAB
CULTURE RESULTS: NO GROWTH — SIGNIFICANT CHANGE UP
SPECIMEN SOURCE: SIGNIFICANT CHANGE UP

## 2021-02-25 ENCOUNTER — APPOINTMENT (OUTPATIENT)
Dept: UROLOGY | Facility: CLINIC | Age: 36
End: 2021-02-25
Payer: COMMERCIAL

## 2021-02-25 VITALS — TEMPERATURE: 98 F

## 2021-02-25 PROBLEM — Z78.9 OTHER SPECIFIED HEALTH STATUS: Chronic | Status: ACTIVE | Noted: 2021-02-22

## 2021-02-25 LAB
BILIRUB UR QL STRIP: NORMAL
CLARITY UR: CLEAR
COLLECTION METHOD: NORMAL
GLUCOSE UR-MCNC: NORMAL
HCG UR QL: 0.2 EU/DL
HGB UR QL STRIP.AUTO: NORMAL
KETONES UR-MCNC: NORMAL
LEUKOCYTE ESTERASE UR QL STRIP: NORMAL
NITRITE UR QL STRIP: NORMAL
PH UR STRIP: 7
PROT UR STRIP-MCNC: 100
SP GR UR STRIP: 1.02

## 2021-02-25 PROCEDURE — 99072 ADDL SUPL MATRL&STAF TM PHE: CPT

## 2021-02-25 PROCEDURE — 99213 OFFICE O/P EST LOW 20 MIN: CPT | Mod: 25

## 2021-02-25 PROCEDURE — 81003 URINALYSIS AUTO W/O SCOPE: CPT | Mod: QW

## 2021-02-25 PROCEDURE — 52000 CYSTOURETHROSCOPY: CPT

## 2021-02-25 NOTE — HISTORY OF PRESENT ILLNESS
[FreeTextEntry1] : Patient returns for follow up.  He is s/p Left URS.  He presented to the [facility] ED with gross hematuria and clot retention.  It is unclear why he had such significant hematuria, but his hematuria has since resolved.  His urine is clear.  He is here for left ureteral stent removal.

## 2021-02-25 NOTE — ASSESSMENT
[FreeTextEntry1] : 36 yo male with left renal stones s/p left URS.  His cystoscopy today revealed residual  edema at the level of the left UO.  A decision was made to leave the stent in place for one more week prior to removal.  I also discussed with the patient that he may want to ask his PCP for hematology w/u given the significant amount of hematuria for a procedure that was relatively mild. I will speak to his PCP about this consideration.

## 2021-02-25 NOTE — PHYSICAL EXAM
[General Appearance - Well Developed] : well developed [Normal Appearance] : normal appearance [General Appearance - In No Acute Distress] : no acute distress [Abdomen Soft] : soft [Abdomen Tenderness] : non-tender [Urethral Meatus] : meatus normal [Skin Color & Pigmentation] : normal skin color and pigmentation [Heart Rate And Rhythm] : Heart rate and rhythm were normal [] : no respiratory distress [Oriented To Time, Place, And Person] : oriented to person, place, and time [Normal Station and Gait] : the gait and station were normal for the patient's age [No Focal Deficits] : no focal deficits

## 2021-03-04 ENCOUNTER — APPOINTMENT (OUTPATIENT)
Dept: UROLOGY | Facility: CLINIC | Age: 36
End: 2021-03-04
Payer: COMMERCIAL

## 2021-03-04 LAB
BILIRUB UR QL STRIP: NORMAL
CLARITY UR: CLEAR
COLLECTION METHOD: NORMAL
GLUCOSE UR-MCNC: NORMAL
HCG UR QL: 0.2 EU/DL
HGB UR QL STRIP.AUTO: NORMAL
KETONES UR-MCNC: NORMAL
LEUKOCYTE ESTERASE UR QL STRIP: NORMAL
NITRITE UR QL STRIP: NORMAL
PH UR STRIP: 6.5
PROT UR STRIP-MCNC: 30
SP GR UR STRIP: 1.02

## 2021-03-04 PROCEDURE — 81003 URINALYSIS AUTO W/O SCOPE: CPT | Mod: QW

## 2021-03-04 PROCEDURE — 99072 ADDL SUPL MATRL&STAF TM PHE: CPT

## 2021-03-04 PROCEDURE — 52310 CYSTOSCOPY AND TREATMENT: CPT

## 2021-03-04 PROCEDURE — 99213 OFFICE O/P EST LOW 20 MIN: CPT | Mod: 25

## 2021-03-04 NOTE — HISTORY OF PRESENT ILLNESS
[FreeTextEntry1] : PAtient returns for stent removal.  He has been well with tolerable stent related discomfort.  He denies any hematuria.

## 2021-03-04 NOTE — ASSESSMENT
[FreeTextEntry1] : 36 yo male with left renal stones s/p URS, and now s/p left ureteral stent removal.  He will get a 24 hour urine collection x 2 for metabolic evaluation and a f/u renal US in 4 weeks.

## 2021-03-04 NOTE — PHYSICAL EXAM
[General Appearance - Well Developed] : well developed [Normal Appearance] : normal appearance [General Appearance - In No Acute Distress] : no acute distress [Abdomen Soft] : soft [Urethral Meatus] : meatus normal [Skin Color & Pigmentation] : normal skin color and pigmentation [Heart Rate And Rhythm] : Heart rate and rhythm were normal [] : no respiratory distress [Oriented To Time, Place, And Person] : oriented to person, place, and time [Normal Station and Gait] : the gait and station were normal for the patient's age [No Focal Deficits] : no focal deficits

## 2021-05-20 ENCOUNTER — APPOINTMENT (OUTPATIENT)
Dept: UROLOGY | Facility: CLINIC | Age: 36
End: 2021-05-20
Payer: COMMERCIAL

## 2021-05-20 VITALS — HEART RATE: 61 BPM | DIASTOLIC BLOOD PRESSURE: 80 MMHG | TEMPERATURE: 98 F | SYSTOLIC BLOOD PRESSURE: 124 MMHG

## 2021-05-20 DIAGNOSIS — Z00.00 ENCOUNTER FOR GENERAL ADULT MEDICAL EXAMINATION W/OUT ABNORMAL FINDINGS: ICD-10-CM

## 2021-05-20 LAB
BILIRUB UR QL STRIP: NORMAL
CLARITY UR: CLEAR
COLLECTION METHOD: NORMAL
GLUCOSE UR-MCNC: NORMAL
HCG UR QL: 0.2 EU/DL
HGB UR QL STRIP.AUTO: NORMAL
KETONES UR-MCNC: NORMAL
LEUKOCYTE ESTERASE UR QL STRIP: NORMAL
NITRITE UR QL STRIP: NORMAL
PH UR STRIP: 7
PROT UR STRIP-MCNC: NORMAL
SP GR UR STRIP: 1.02

## 2021-05-20 PROCEDURE — 99213 OFFICE O/P EST LOW 20 MIN: CPT

## 2021-05-20 PROCEDURE — 99072 ADDL SUPL MATRL&STAF TM PHE: CPT

## 2021-05-20 NOTE — PHYSICAL EXAM
[General Appearance - Well Developed] : well developed [Normal Appearance] : normal appearance [General Appearance - In No Acute Distress] : no acute distress [Abdomen Soft] : soft [Skin Color & Pigmentation] : normal skin color and pigmentation [Heart Rate And Rhythm] : Heart rate and rhythm were normal [] : no respiratory distress [Oriented To Time, Place, And Person] : oriented to person, place, and time [Normal Station and Gait] : the gait and station were normal for the patient's age [No Focal Deficits] : no focal deficits

## 2021-05-20 NOTE — ASSESSMENT
[FreeTextEntry1] : 34 yo male with left nephrolithiasis in the setting of low urine volume, borderline hypocitraturia, borderline hyperoxaluria, and slightly increased urine sodium.  I encouraged him to increase his fluid intake to 2.5-3 liters daily.  We discussed staying away from high oxalate containing foods and we reviewed resources for finding al ist of high oxalate containing foods to minimize.  We also discussed adding citrate into his diet with lemon juice which hecan add to his water intake.  \par \par Lastly, we discuss his CT scan results.  There is a 5 mm stone noted on CT which we discussed treatment options for including URS and ESWL.  I explained that in order to perform ESWL the stone would need to be visible on plain Xray.  If it is visible then we can proceed with ESWL.  He would like to proceed with KUB

## 2021-05-20 NOTE — HISTORY OF PRESENT ILLNESS
[FreeTextEntry1] : 34 yo male returns for review of 24 hour urine collection x 2.  He is s/p left URS for left renal stones.  He also reports that he had a CT scan performed by his PCP which showed small residual stones in the left kidney in addition to a larger 5 mm stone note appreciated on previous imaging.  \par \par His 24 hour urine collection is significant for decreased urine volume, borderline hypocitraturia, slight hyperoxaluria and slightly elevated urine sodium.  \par \par He currently feels well and is without complaint.

## 2021-05-21 LAB
APPEARANCE: CLEAR
BACTERIA: NEGATIVE
BILIRUBIN URINE: NEGATIVE
BLOOD URINE: NEGATIVE
COLOR: NORMAL
GLUCOSE QUALITATIVE U: NEGATIVE
HYALINE CASTS: 1 /LPF
KETONES URINE: NEGATIVE
LEUKOCYTE ESTERASE URINE: NEGATIVE
MICROSCOPIC-UA: NORMAL
NITRITE URINE: NEGATIVE
PH URINE: 6.5
PROTEIN URINE: NEGATIVE
RED BLOOD CELLS URINE: 0 /HPF
SPECIFIC GRAVITY URINE: 1.02
SQUAMOUS EPITHELIAL CELLS: 0 /HPF
UROBILINOGEN URINE: NORMAL
WHITE BLOOD CELLS URINE: 1 /HPF

## 2021-05-24 LAB — BACTERIA UR CULT: NORMAL

## 2021-06-10 ENCOUNTER — APPOINTMENT (OUTPATIENT)
Dept: UROLOGY | Facility: CLINIC | Age: 36
End: 2021-06-10
Payer: COMMERCIAL

## 2021-06-10 DIAGNOSIS — N20.0 CALCULUS OF KIDNEY: ICD-10-CM

## 2021-06-10 PROCEDURE — 99213 OFFICE O/P EST LOW 20 MIN: CPT | Mod: 95

## 2021-06-10 NOTE — PHYSICAL EXAM
[General Appearance - Well Developed] : well developed [Normal Appearance] : normal appearance [General Appearance - In No Acute Distress] : no acute distress

## 2021-06-10 NOTE — HISTORY OF PRESENT ILLNESS
[FreeTextEntry1] : \par I contacted Mr. VALDEZ TYSON by telemedicine using the [employer] platform. The patient was located at his  home address in [place].  The appropriate consent was obtained prior to the conference.  The primary purpose of the meeting was to discuss his genitourinary symptoms.\par \par The patient-doctor relationship has been established in a face to face fashion via real time video/audio HIPAA compliant communication using telemedicine software. The patient's identity has been confirmed. The patient was previously emailed a copy of the telemedicine consent. They have had a chance to review and has now given verbal consent and has requested care to be assessed and treated through telemedicine and understands there maybe limitations in this process and they may need further follow up care in the office and or hospital settings.\par   .\par \par Patient returns to discuss his KUB results for the purpose of possible ESWL for 4 mm stone in left kidney.  The KUB could not see any radiopaque stones in either kidney.  There was some stool in the left colon overlying the left kidney which may have obscured the stones seen on the CT scan.

## 2021-06-10 NOTE — ASSESSMENT
[FreeTextEntry1] : 36 yo male with 2 stone fragments in the left kidney ranging in size from 2 to 4 mm.  Stones are not visible on KUB.  I discussed options with im including repeat KUB, Observation or repeat URS.  He prefers to observe the stones for now and avoid any intervention.  He will let me know if he develops any flank pain or other symptoms.  He will return in 6 months for US.

## 2022-10-14 NOTE — H&P ADULT - NSHPPOAPRESSUREULCER_GEN_ALL_CORE
-- c/w Cellulitis  -- low suspicion for Nec Fasc or compartment syndrome at this time  -- on cefazolin     no

## 2023-05-05 NOTE — ED ADULT NURSE NOTE - BOWEL SOUNDS RUQ
Alert-The patient is alert, awake and responds to voice. The patient is oriented to time, place, and person. The triage nurse is able to obtain subjective information.
present